# Patient Record
Sex: FEMALE | Race: ASIAN
[De-identification: names, ages, dates, MRNs, and addresses within clinical notes are randomized per-mention and may not be internally consistent; named-entity substitution may affect disease eponyms.]

---

## 2019-04-12 ENCOUNTER — HOSPITAL ENCOUNTER (EMERGENCY)
Dept: HOSPITAL 74 - JER | Age: 29
Discharge: HOME | End: 2019-04-12
Payer: COMMERCIAL

## 2019-04-12 VITALS — BODY MASS INDEX: 24.7 KG/M2

## 2019-04-12 VITALS — DIASTOLIC BLOOD PRESSURE: 73 MMHG | HEART RATE: 96 BPM | TEMPERATURE: 99.4 F | SYSTOLIC BLOOD PRESSURE: 123 MMHG
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
## 2019-04-12 DIAGNOSIS — T78.40XA: ICD-10-CM

## 2019-04-12 NOTE — PDOC
History of Present Illness





- General


Chief Complaint: Head/Neck problem


Stated Complaint: NUMB FACE/HEADACHE


Time Seen by Provider: 04/12/19 22:00





- History of Present Illness


Initial Comments: 





04/12/19 22:14


29 yo F with no significant pmh who right facial swelling, redness, itching, 

and BL neck itching. Patient reports acute onset of right sided facial swelling

, numbness, and hives, with itching of BL UE, and itching up BL UE. Endorses 

lightheadedness, now improved. Patient with no identifiable triggers. Symptoms 

improved with diphehydramine. Denies recent contact exposure, topical emollients

, detergents, diet changes, medication change. Denies h/o similar presentation. 

Denies h/o EpiPen use, or anaphylaxis.





Patient denies HA, vision change, palpitations, cough, wheezing, orthopena, PND

, leg swelling/pain, N/V, F,C, CP, SOB, urinary complaints, hematuria, BPR, 

abdominal pain, diarrhea, constipation, weakness, sensory changes.





PMHx: as noted above


ROS: as noted


SHx: Denies IVDA


Allergies: NKDA











Past History





- Past Medical History


Allergies/Adverse Reactions: 


 Allergies











Allergy/AdvReac Type Severity Reaction Status Date / Time


 


No Known Allergies Allergy   Verified 04/12/19 21:43











Home Medications: 


Ambulatory Orders





Epinephrine [Epipen] 0.3 mg IJ PRN PRN #1 auto.injct MDD 1 04/12/19 


Prednisone [Prednisone 50 MG TABLETS] 50 mg PO DAILY #4 tablet MDD 1 tab 04/12/ 19 


Ranitidine [Zantac -] 150 mg PO DAILY #4 tablet MDD 1 tab 04/12/19 








COPD: No





- Suicide/Smoking/Psychosocial Hx


Smoking History: Never smoked


Have you smoked in the past 12 months: No


Information on smoking cessation initiated: No


Hx Alcohol Use: No


Drug/Substance Use Hx: No





*Physical Exam





- Vital Signs


 Last Vital Signs











Temp Pulse Resp BP Pulse Ox


 


 99.4 F   96 H  18   123/73   100 


 


 04/12/19 21:40  04/12/19 21:40  04/12/19 21:40  04/12/19 21:40  04/12/19 21:40














- Physical Exam


Comments: 





04/12/19 22:20


GENERAL: Awake, alert, and fully oriented, in no acute distress


HEAD: No signs of trauma, normocephalic, atraumatic 


EYES: PERRLA, EOMI, sclera anicteric, conjunctiva clear


ENT: + Right sided buccal swelling, with absent fluctuance, erythema, mucosal 

edema, uvula deviation. Auricles normal inspection, hearing grossly normal, 

nares patent, oropharynx clear without


exudates. Moist mucosa


NECK: Normal ROM, supple, no lymphadenopathy, JVD, or masses


LUNGS: No distress, speaks full sentences, clear to auscultation bilaterally 


HEART: Regular rate and rhythm, normal S1 and S2, no murmurs, rubs or gallops, 

peripheral pulses normal and equal bilaterally. 


ABDOMEN: Soft, nontender, normoactive bowel sounds.  No guarding, no rebound.  

No masses


EXTREMITIES : Normal inspection, Normal range of motion, no edema.  No clubbing 

or cyanosis. 


NEUROLOGICAL: Cranial nerves II through XII grossly intact.  Normal speech, 

normal gait, no focal sensorimotor deficits 


SKIN: Warm, Dry, normal turgor, no rashes or lesions noted








Medical Decision Making





- Medical Decision Making





04/12/19 22:20


29 yo F with no significant pmh who right facial swelling, redness, itching, 

and BL neck itching. Vitals wnl, AF, A&OX3. 0/4 SIRS criteria.+ Right sided 

buccal swelling, with absent fluctuance, erythema, mucosal edema, uvula 

deviation. No evidence of airway compromise. No evidence of anaphylaxis, or 

systemic organ involvement. Denies muffled voice, trismus, vision change, 

palpitations, cough, wheezing, o N/V, F,C, CP, SOB, urinary complaints, 

hematuria, BPR, abdominal pain, diarrhea, weakness, sensory changes. Low 

suspicion bells palsy, no upper face involvement. Will consider 

sialadenolithiasis/itis. No evidence of tonislar deviation, or dentla or 

peritonsilar abscess within oropharyngeal cavity.  











ED Course: 


04/12/19 22:28


Ranitidine 150 mg, Prednisone 60 mg 


04/12/19 22:38


Ranitidine, Prednisone, EpiPen sent to pharm


04/12/19 22:50


No evidence of airway compromise. Stable for d/c with return precautions


Advised to f/u allergist. 





*DC/Admit/Observation/Transfer


Diagnosis at time of Disposition: 


 Facial swelling








- Discharge Dispostion


Condition at time of disposition: Stable





- Prescriptions


Prescriptions: 


Epinephrine [Epipen] 0.3 mg IJ PRN PRN #1 auto.injct MDD 1


 PRN Reason: Shortness Of Breath


Prednisone [Prednisone 50 MG TABLETS] 50 mg PO DAILY #4 tablet MDD 1 tab


Ranitidine [Zantac -] 150 mg PO DAILY #4 tablet MDD 1 tab





- Referrals


Referrals: 


Alessandro Deutsch MD [Staff Physician] - 





- Patient Instructions


Printed Discharge Instructions:  DI for General Allergic Reactions


Additional Instructions: 


Please return to the emergency department with any new or worsening symptoms or 

concerns. Please follow up with your primary care physician within 72 hours. 

Please take Ranitidine and Prednisone as prescribed. Please follow up with 

allergist within one week. 








- Post Discharge Activity

## 2019-04-12 NOTE — PDOC
Attending Attestation





- HPI


HPI: 


The patient is a 28 year old female, with no significant PMH, who presents to 

the emergency department today complaining of sudden onset right-sided facial 

swelling for one day. Patient was relaxing at home when her face suddenly felt 

numb. She then noticed it was significantly swollen, red, and itchy. She notes 

that the swelling is most prominent on the right cheek and right upper lip, and 

the itching radiates from her face to her throat, chest, arms, and hands. 

Patient endorses associated throat tightness, which was alleviated after taking 

benadryl prior to arrival. She also notes lightheadedness earlier, which has 

now resolved. Patient is unsure of any identifiable triggers, and reports 

having [place] earlier today. She denies history of allergic reactions.





The patient denies chest pain, shortness of breath, headache and dizziness.


Denies fever, chills, nausea, vomit, diarrhea and constipation.


Denies dysuria, frequency, urgency and hematuria.


Denies sick contacts, changes in household products, diet changes, or 

medication changes.





Allergies: NKA


Past surgical history: None reported 


Social history: None reported 





04/12/19 23:20








- Physicial Exam


PE: 


GENERAL: Awake, alert, and oriented. The patient is in no acute distress.


HEAD: +Right buccal edema. +Right upper lip swelling. Normal with no signs of 

trauma.


EYES: PERRLA, EOMI, sclera anicteric, conjunctiva clear.


ENT: +Right buccal edema. +Right upper lip swelling. Ears normal, nares patent, 

oropharynx clear without exudates. Moist mucous membranes. No uvula edema. 


NECK: Normal range of motion, supple without lymphadenopathy, JVD, or masses.


LUNGS: Breath sounds equal, clear to auscultation bilaterally. No wheezes, and 

no crackles.


HEART:Regular rate and rhythm, normal S1 and S2 without murmur, rub or gallop.


ABDOMEN: Soft, nontender, normoactive bowel sounds. No guarding, no


rebound. No masses palpable.


EXTREMITIES: Normal range of motion, no edema. No clubbing or cyanosis. No 

erythema, or tenderness.


NEUROLOGICAL: Cranial nerves II through XII grossly intact. Normal speech. No 

focal neurological deficits.


MUSCULOSKELETAL: Back non-tender to palpation, no CVA tenderness


SKIN: Warm, Dry, normal turgor, no rashes or lesions noted. No urticaria. 





04/12/19 23:20








- Medical Decision Making


Documentation prepared by JAMES Flores, acting as medical scribe for 

Ana Tay MD.





04/12/19 23:20








<Chanelle Lindsay - Last Filed: 04/12/19 23:20>





- Resident


Resident Name: Joaquin Hollingsworth





- ED Attending Attestation


I have performed the following: I have examined & evaluated the patient, The 

case was reviewed & discussed with the resident, I agree w/resident's findings 

& plan, Exceptions are as noted





- Medical Decision Making





04/12/19 23:04


Ms Sanon is a 29 yo F, 1 month post partum presenting with right lip and 

cheek swelling


No tongue swelling


No difficulty swallowing


No drooling


No wheezing


Pt has not contacted any new detergents, lotions, foods, no pets


No facial trauma


Pt took Benadryl prior to arrival with improvement in swelling





Unclear the cause of pt reaction


Pt asked to follow up with Allergist


Return to the ER IMMEDIATELY for any recurrence/worsening of symptoms





clinical impression: Allergic reaction, initial presentation


04/13/19 20:26








<Ana Tay - Last Filed: 04/13/19 20:27>